# Patient Record
Sex: FEMALE | Race: BLACK OR AFRICAN AMERICAN | URBAN - METROPOLITAN AREA
[De-identification: names, ages, dates, MRNs, and addresses within clinical notes are randomized per-mention and may not be internally consistent; named-entity substitution may affect disease eponyms.]

---

## 2017-03-06 ENCOUNTER — TELEPHONE (OUTPATIENT)
Dept: SURGERY | Facility: CLINIC | Age: 57
End: 2017-03-06

## 2017-03-06 NOTE — TELEPHONE ENCOUNTER
pt called. She is taking Protonix,  This is giving her a lot of gas. Does she Need to take this med? Can she take something else. Please advise.

## 2017-03-06 NOTE — TELEPHONE ENCOUNTER
Patient contacted, states that she recently resumed taking Protonix after being off the medication for 3 months while she was being treated for Hepatitis C. Patient states that during those 3 months had no complaints of heartburn.   Patient states that sin